# Patient Record
Sex: FEMALE | Race: BLACK OR AFRICAN AMERICAN | URBAN - METROPOLITAN AREA
[De-identification: names, ages, dates, MRNs, and addresses within clinical notes are randomized per-mention and may not be internally consistent; named-entity substitution may affect disease eponyms.]

---

## 2022-03-04 ENCOUNTER — OFFICE VISIT (OUTPATIENT)
Dept: URBAN - METROPOLITAN AREA CLINIC 84 | Facility: CLINIC | Age: 32
End: 2022-03-04
Payer: COMMERCIAL

## 2022-03-04 ENCOUNTER — WEB ENCOUNTER (OUTPATIENT)
Dept: URBAN - METROPOLITAN AREA CLINIC 84 | Facility: CLINIC | Age: 32
End: 2022-03-04

## 2022-03-04 DIAGNOSIS — R10.13 DYSPEPSIA: ICD-10-CM

## 2022-03-04 DIAGNOSIS — K21.9 GERD: ICD-10-CM

## 2022-03-04 DIAGNOSIS — R19.7 DIARRHEA: ICD-10-CM

## 2022-03-04 DIAGNOSIS — D50.9 MICROCYTIC ANEMIA: ICD-10-CM

## 2022-03-04 DIAGNOSIS — R10.30 LOWER ABDOMINAL PAIN, UNSPECIFIED: ICD-10-CM

## 2022-03-04 PROCEDURE — 99204 OFFICE O/P NEW MOD 45 MIN: CPT | Performed by: INTERNAL MEDICINE

## 2022-03-04 RX ORDER — FERROUS SULFATE 220 (44)/5
AS DIRECTED ELIXIR ORAL
Status: ACTIVE | COMMUNITY

## 2022-03-04 RX ORDER — NAPROXEN 500 MG/1
1 TABLET WITH FOOD OR MILK AS NEEDED TABLET ORAL
Status: ACTIVE | COMMUNITY

## 2022-03-04 RX ORDER — FAMOTIDINE 20 MG/1
1 TABLET AT BEDTIME AS NEEDED TABLET, FILM COATED ORAL ONCE A DAY
Status: ACTIVE | COMMUNITY

## 2022-03-04 RX ORDER — PANTOPRAZOLE SODIUM 40 MG/1
1 TABLET TABLET, DELAYED RELEASE ORAL ONCE A DAY
Qty: 30 TABLET | Refills: 5 | OUTPATIENT
Start: 2022-03-04

## 2022-03-04 RX ORDER — POLYETHYLENE GLYCOL 3350, SODIUM SULFATE, SODIUM CHLORIDE, POTASSIUM CHLORIDE, ASCORBIC ACID, SODIUM ASCORBATE 140-9-5.2G
AS DIRECTED KIT ORAL AS DIRECTED
Qty: 1 BOX | Refills: 0 | OUTPATIENT
Start: 2022-03-04 | End: 2022-03-05

## 2022-03-04 RX ORDER — VALACYCLOVIR 1 G/1
1 TABLET TABLET, FILM COATED ORAL ONCE A DAY
Status: ACTIVE | COMMUNITY

## 2022-03-04 RX ORDER — DOCUSATE SODIUM 100 MG/1
1 CAPSULE AS NEEDED CAPSULE, LIQUID FILLED ORAL ONCE A DAY
Status: ACTIVE | COMMUNITY

## 2022-03-04 RX ORDER — IBUPROFEN 800 MG/1
1 TABLET WITH FOOD OR MILK AS NEEDED TABLET ORAL THREE TIMES A DAY
Status: ACTIVE | COMMUNITY

## 2022-03-04 NOTE — PHYSICAL EXAM GASTROINTESTINAL
Abdomen , soft, nontender, nondistended , no guarding or rigidity , no masses palpable , normal bowel sounds , Liver and Spleen , no hepatomegaly present , no hepatosplenomegaly , liver nontender , spleen not palpable  , left lower quadrant tenderness , right lower quadrant tenderness

## 2022-03-04 NOTE — HPI-TODAY'S VISIT:
The patient was referred by Dr. Anthony Panda for multiple GI complaints .   A copy of this document is being forwarded to the referring provider.  She has an erratic appetite.  She denies weight loss.  She has a lot of bloat and distension.  She has a lower abdominal pain but also epigastric pain.  The pain increases with PO intake.  She has a lot of GERD.  She has daily nausea with occasional vomiting.  She has developed dysphagia to solids.  She has early satiety.  She often has soft stool.  SHe does have some intermittent strain.  SHe will often have to return to have multiple BM's a day.  She denies LGI bleed or melena.  She was recentl told that she ahd anemia with a Hgb of 7.9.  She has been told about anemia in the past.  She has been having heavy menstrual cycle.  She passes blood clots with her cycle.  She went to the [facility] ER for the anemia.  She ahd heme nagative stool.  Hgb in te ED was 8.2 with an MCV of 70.  She has not had prior EGD or colonoscopy

## 2022-03-07 ENCOUNTER — TELEPHONE ENCOUNTER (OUTPATIENT)
Dept: URBAN - METROPOLITAN AREA CLINIC 92 | Facility: CLINIC | Age: 32
End: 2022-03-07

## 2022-03-07 ENCOUNTER — OFFICE VISIT (OUTPATIENT)
Dept: URBAN - METROPOLITAN AREA SURGERY CENTER 20 | Facility: SURGERY CENTER | Age: 32
End: 2022-03-07
Payer: COMMERCIAL

## 2022-03-07 ENCOUNTER — CLAIMS CREATED FROM THE CLAIM WINDOW (OUTPATIENT)
Dept: URBAN - METROPOLITAN AREA CLINIC 4 | Facility: CLINIC | Age: 32
End: 2022-03-07
Payer: COMMERCIAL

## 2022-03-07 DIAGNOSIS — K52.832 LYMPHOCYTIC COLITIS: ICD-10-CM

## 2022-03-07 DIAGNOSIS — K63.89 GASEOUS DISTENTION OF INTESTINE DETERMINED BY X-RAY: ICD-10-CM

## 2022-03-07 DIAGNOSIS — K63.89 BACTERIAL OVERGROWTH SYNDROME: ICD-10-CM

## 2022-03-07 PROBLEM — 234349007: Status: ACTIVE | Noted: 2022-03-04

## 2022-03-07 PROBLEM — 235595009 GASTROESOPHAGEAL REFLUX DISEASE: Status: ACTIVE | Noted: 2022-03-04

## 2022-03-07 PROCEDURE — 88313 SPECIAL STAINS GROUP 2: CPT | Performed by: PATHOLOGY

## 2022-03-07 PROCEDURE — 45380 COLONOSCOPY AND BIOPSY: CPT | Performed by: INTERNAL MEDICINE

## 2022-03-07 PROCEDURE — 88305 TISSUE EXAM BY PATHOLOGIST: CPT | Performed by: PATHOLOGY

## 2022-03-07 PROCEDURE — G8907 PT DOC NO EVENTS ON DISCHARG: HCPCS | Performed by: INTERNAL MEDICINE

## 2022-03-07 PROCEDURE — 88342 IMHCHEM/IMCYTCHM 1ST ANTB: CPT | Performed by: PATHOLOGY

## 2022-03-07 RX ORDER — NAPROXEN 500 MG/1
1 TABLET WITH FOOD OR MILK AS NEEDED TABLET ORAL
Status: ACTIVE | COMMUNITY

## 2022-03-07 RX ORDER — FAMOTIDINE 20 MG/1
1 TABLET AT BEDTIME AS NEEDED TABLET, FILM COATED ORAL ONCE A DAY
Status: ACTIVE | COMMUNITY

## 2022-03-07 RX ORDER — VALACYCLOVIR 1 G/1
1 TABLET TABLET, FILM COATED ORAL ONCE A DAY
Status: ACTIVE | COMMUNITY

## 2022-03-07 RX ORDER — FERROUS SULFATE 220 (44)/5
AS DIRECTED ELIXIR ORAL
Status: ACTIVE | COMMUNITY

## 2022-03-07 RX ORDER — PANTOPRAZOLE SODIUM 40 MG/1
1 TABLET TABLET, DELAYED RELEASE ORAL ONCE A DAY
Qty: 30 TABLET | Refills: 5 | Status: ACTIVE | COMMUNITY
Start: 2022-03-04

## 2022-03-07 RX ORDER — DOCUSATE SODIUM 100 MG/1
1 CAPSULE AS NEEDED CAPSULE, LIQUID FILLED ORAL ONCE A DAY
Status: ACTIVE | COMMUNITY

## 2022-03-07 RX ORDER — DICYCLOMINE HYDROCHLORIDE 20 MG/1
1- 2 TABLETS TABLET ORAL
Qty: 60 | Refills: 2 | OUTPATIENT
Start: 2022-03-07 | End: 2022-06-05

## 2022-03-07 RX ORDER — IBUPROFEN 800 MG/1
1 TABLET WITH FOOD OR MILK AS NEEDED TABLET ORAL THREE TIMES A DAY
Status: ACTIVE | COMMUNITY

## 2022-03-18 ENCOUNTER — WEB ENCOUNTER (OUTPATIENT)
Dept: URBAN - METROPOLITAN AREA CLINIC 84 | Facility: CLINIC | Age: 32
End: 2022-03-18

## 2022-03-22 ENCOUNTER — WEB ENCOUNTER (OUTPATIENT)
Dept: URBAN - METROPOLITAN AREA CLINIC 84 | Facility: CLINIC | Age: 32
End: 2022-03-22

## 2022-03-23 ENCOUNTER — WEB ENCOUNTER (OUTPATIENT)
Dept: URBAN - METROPOLITAN AREA CLINIC 84 | Facility: CLINIC | Age: 32
End: 2022-03-23

## 2022-03-23 ENCOUNTER — TELEPHONE ENCOUNTER (OUTPATIENT)
Dept: URBAN - METROPOLITAN AREA CLINIC 92 | Facility: CLINIC | Age: 32
End: 2022-03-23

## 2022-03-23 RX ORDER — DOCUSATE SODIUM 100 MG/1
1 CAPSULE AS NEEDED CAPSULE, LIQUID FILLED ORAL ONCE A DAY
Status: ACTIVE | COMMUNITY

## 2022-03-23 RX ORDER — BUDESONIDE 3 MG/1
3 CAPSULES CAPSULE, COATED PELLETS ORAL ONCE A DAY
Qty: 90 | Refills: 5 | OUTPATIENT
Start: 2022-03-23

## 2022-03-23 RX ORDER — IBUPROFEN 800 MG/1
1 TABLET WITH FOOD OR MILK AS NEEDED TABLET ORAL THREE TIMES A DAY
Status: ACTIVE | COMMUNITY

## 2022-03-23 RX ORDER — FAMOTIDINE 20 MG/1
1 TABLET AT BEDTIME AS NEEDED TABLET, FILM COATED ORAL ONCE A DAY
Status: ACTIVE | COMMUNITY

## 2022-03-23 RX ORDER — PANTOPRAZOLE SODIUM 40 MG/1
1 TABLET TABLET, DELAYED RELEASE ORAL ONCE A DAY
Qty: 30 TABLET | Refills: 5 | Status: ACTIVE | COMMUNITY
Start: 2022-03-04

## 2022-03-23 RX ORDER — DICYCLOMINE HYDROCHLORIDE 20 MG/1
1- 2 TABLETS TABLET ORAL
Qty: 60 | Refills: 2 | Status: ACTIVE | COMMUNITY
Start: 2022-03-07 | End: 2022-06-05

## 2022-03-23 RX ORDER — VALACYCLOVIR 1 G/1
1 TABLET TABLET, FILM COATED ORAL ONCE A DAY
Status: ACTIVE | COMMUNITY

## 2022-03-23 RX ORDER — NAPROXEN 500 MG/1
1 TABLET WITH FOOD OR MILK AS NEEDED TABLET ORAL
Status: ACTIVE | COMMUNITY

## 2022-03-23 RX ORDER — FERROUS SULFATE 220 (44)/5
AS DIRECTED ELIXIR ORAL
Status: ACTIVE | COMMUNITY

## 2022-03-25 ENCOUNTER — WEB ENCOUNTER (OUTPATIENT)
Dept: URBAN - METROPOLITAN AREA CLINIC 84 | Facility: CLINIC | Age: 32
End: 2022-03-25

## 2022-04-05 ENCOUNTER — WEB ENCOUNTER (OUTPATIENT)
Dept: URBAN - METROPOLITAN AREA CLINIC 84 | Facility: CLINIC | Age: 32
End: 2022-04-05

## 2022-06-06 ENCOUNTER — TELEPHONE ENCOUNTER (OUTPATIENT)
Dept: URBAN - METROPOLITAN AREA CLINIC 84 | Facility: CLINIC | Age: 32
End: 2022-06-06

## 2022-06-08 ENCOUNTER — WEB ENCOUNTER (OUTPATIENT)
Dept: URBAN - METROPOLITAN AREA CLINIC 84 | Facility: CLINIC | Age: 32
End: 2022-06-08

## 2023-08-08 ENCOUNTER — WEB ENCOUNTER (OUTPATIENT)
Dept: URBAN - METROPOLITAN AREA CLINIC 29 | Facility: CLINIC | Age: 33
End: 2023-08-08

## 2023-08-08 ENCOUNTER — OFFICE VISIT (OUTPATIENT)
Dept: URBAN - METROPOLITAN AREA CLINIC 29 | Facility: CLINIC | Age: 33
End: 2023-08-08
Payer: COMMERCIAL

## 2023-08-08 ENCOUNTER — LAB OUTSIDE AN ENCOUNTER (OUTPATIENT)
Dept: URBAN - METROPOLITAN AREA CLINIC 29 | Facility: CLINIC | Age: 33
End: 2023-08-08

## 2023-08-08 ENCOUNTER — DASHBOARD ENCOUNTERS (OUTPATIENT)
Age: 33
End: 2023-08-08

## 2023-08-08 VITALS
SYSTOLIC BLOOD PRESSURE: 109 MMHG | HEIGHT: 67 IN | DIASTOLIC BLOOD PRESSURE: 83 MMHG | HEART RATE: 77 BPM | WEIGHT: 130 LBS | BODY MASS INDEX: 20.4 KG/M2

## 2023-08-08 DIAGNOSIS — K52.832 LYMPHOCYTIC COLITIS: ICD-10-CM

## 2023-08-08 DIAGNOSIS — R10.13 DYSPEPSIA: ICD-10-CM

## 2023-08-08 PROBLEM — 1187071008: Status: ACTIVE | Noted: 2023-08-08

## 2023-08-08 PROCEDURE — 99204 OFFICE O/P NEW MOD 45 MIN: CPT | Performed by: INTERNAL MEDICINE

## 2023-08-08 RX ORDER — NAPROXEN 500 MG/1
1 TABLET WITH FOOD OR MILK AS NEEDED TABLET ORAL
Status: ACTIVE | COMMUNITY

## 2023-08-08 RX ORDER — ONDANSETRON HYDROCHLORIDE 4 MG/1
1 TABLET TABLET, FILM COATED ORAL
Qty: 2 | Refills: 0 | OUTPATIENT
Start: 2023-08-08

## 2023-08-08 RX ORDER — SODIUM, POTASSIUM,MAG SULFATES 17.5-3.13G
177ML SOLUTION, RECONSTITUTED, ORAL ORAL
Qty: 1 KIT | Refills: 0 | OUTPATIENT
Start: 2023-08-08 | End: 2023-08-09

## 2023-08-08 RX ORDER — FERROUS SULFATE 220 (44)/5
AS DIRECTED ELIXIR ORAL
Status: ACTIVE | COMMUNITY

## 2023-08-08 RX ORDER — IBUPROFEN 800 MG/1
1 TABLET WITH FOOD OR MILK AS NEEDED TABLET ORAL THREE TIMES A DAY
Status: ACTIVE | COMMUNITY

## 2023-08-08 RX ORDER — VALACYCLOVIR 1 G/1
1 TABLET TABLET, FILM COATED ORAL ONCE A DAY
Status: ACTIVE | COMMUNITY

## 2023-08-08 RX ORDER — FAMOTIDINE 20 MG/1
1 TABLET AT BEDTIME AS NEEDED TABLET, FILM COATED ORAL ONCE A DAY
Status: ACTIVE | COMMUNITY

## 2023-08-08 RX ORDER — PANTOPRAZOLE SODIUM 40 MG/1
1 TABLET TABLET, DELAYED RELEASE ORAL ONCE A DAY
Qty: 30 TABLET | Refills: 5 | Status: ACTIVE | COMMUNITY
Start: 2022-03-04

## 2023-08-08 RX ORDER — BUDESONIDE 3 MG/1
3 CAPSULES CAPSULE, COATED PELLETS ORAL ONCE A DAY
Qty: 90 | Refills: 5 | Status: ACTIVE | COMMUNITY
Start: 2022-03-23

## 2023-08-08 RX ORDER — DOCUSATE SODIUM 100 MG/1
1 CAPSULE AS NEEDED CAPSULE, LIQUID FILLED ORAL ONCE A DAY
Status: ACTIVE | COMMUNITY

## 2023-08-08 NOTE — HPI-TODAY'S VISIT:
Ms. 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 She never took the budesonide scheduled.   She has diarrhea 2-3 times a week. She  states she typically wakes up in the middle of the night 4-5 loose stools.  She has associated cramping and bloating and indigestion.  She has significant nausea with occasional vomiting.  Of note, she had an inflammatory process last year when she had severe arthritis, and she lost the ability to walk.   She has not had that fully diagnosed.

## 2023-08-15 ENCOUNTER — LAB OUTSIDE AN ENCOUNTER (OUTPATIENT)
Dept: URBAN - METROPOLITAN AREA CLINIC 29 | Facility: CLINIC | Age: 33
End: 2023-08-15

## 2023-08-23 ENCOUNTER — TELEPHONE ENCOUNTER (OUTPATIENT)
Dept: URBAN - METROPOLITAN AREA CLINIC 27 | Facility: CLINIC | Age: 33
End: 2023-08-23

## 2023-09-08 ENCOUNTER — OFFICE VISIT (OUTPATIENT)
Dept: URBAN - METROPOLITAN AREA SURGERY CENTER 7 | Facility: SURGERY CENTER | Age: 33
End: 2023-09-08

## 2023-09-25 ENCOUNTER — OFFICE VISIT (OUTPATIENT)
Dept: URBAN - METROPOLITAN AREA SURGERY CENTER 7 | Facility: SURGERY CENTER | Age: 33
End: 2023-09-25